# Patient Record
Sex: FEMALE | Race: WHITE | URBAN - METROPOLITAN AREA
[De-identification: names, ages, dates, MRNs, and addresses within clinical notes are randomized per-mention and may not be internally consistent; named-entity substitution may affect disease eponyms.]

---

## 2020-11-12 ENCOUNTER — OFFICE VISIT (OUTPATIENT)
Dept: URBAN - METROPOLITAN AREA CLINIC 92 | Facility: CLINIC | Age: 55
End: 2020-11-12
Payer: COMMERCIAL

## 2020-11-12 VITALS
HEART RATE: 71 BPM | HEIGHT: 65 IN | DIASTOLIC BLOOD PRESSURE: 94 MMHG | WEIGHT: 293 LBS | BODY MASS INDEX: 48.82 KG/M2 | TEMPERATURE: 94.6 F | SYSTOLIC BLOOD PRESSURE: 170 MMHG

## 2020-11-12 DIAGNOSIS — R10.9 ABDOMINAL PAIN: ICD-10-CM

## 2020-11-12 DIAGNOSIS — R19.7 DIARRHEA: ICD-10-CM

## 2020-11-12 DIAGNOSIS — R11.0 NAUSEA: ICD-10-CM

## 2020-11-12 PROCEDURE — 99204 OFFICE O/P NEW MOD 45 MIN: CPT | Performed by: INTERNAL MEDICINE

## 2020-11-12 RX ORDER — ONDANSETRON HYDROCHLORIDE 8 MG/1
1 TABLET AS NEEDED TABLET, FILM COATED ORAL
Qty: 90 | Refills: 1 | OUTPATIENT
Start: 2020-11-12

## 2020-11-12 RX ORDER — HYOSCYAMINE SULFATE 0.12 MG/1
1 TABLET UNDER THE TONGUE AND ALLOW TO DISSOLVE  AS NEEDED TABLET SUBLINGUAL
Qty: 90 | Refills: 3 | OUTPATIENT

## 2020-11-12 NOTE — HPI-OTHER HISTORIES
Chronic pain x yrs, now worse x 1.5mo  s/p CCY many yrs ago   Abd pain Location RUQ Quality sharp, tingling Frequency daily Duration 1-2 days Radiation none Severity = mod to severe Exacerbating factors eating Relieving factors fasting   Assoc with diarrhea, nausea   Denies emesis constipation hematochezia melena jaundice unintentional wt loss   Denies dyspepsia htbrn dysphagia odynophagia food impaction CP cough anorexia light headedness  NSAID usage: occas ibuprofen for achiness  EtOH / Tob: none  Fam Hx GI cancer: none  CT scan, RUQ US both negative  tried Nxium , no relief  no prior colon  lives in [place] worked as

## 2020-12-10 ENCOUNTER — ERX REFILL RESPONSE (OUTPATIENT)
Dept: URBAN - METROPOLITAN AREA CLINIC 92 | Facility: CLINIC | Age: 55
End: 2020-12-10

## 2020-12-10 RX ORDER — ONDANSETRON HYDROCHLORIDE 8 MG/1
1 TABLET AS NEEDED TABLET, FILM COATED ORAL
Qty: 90 | Refills: 1 | OUTPATIENT

## 2020-12-10 RX ORDER — ONDANSETRON HYDROCHLORIDE 8 MG/1
TAKE 1 TABLET AS NEEDED THREE TIMES DAILY TABLET, FILM COATED ORAL
Qty: 90 TABLET | Refills: 1 | OUTPATIENT

## 2021-01-18 ENCOUNTER — OFFICE VISIT (OUTPATIENT)
Dept: URBAN - METROPOLITAN AREA SURGERY CENTER 16 | Facility: SURGERY CENTER | Age: 56
End: 2021-01-18

## 2021-02-19 ENCOUNTER — TELEPHONE ENCOUNTER (OUTPATIENT)
Dept: URBAN - METROPOLITAN AREA CLINIC 92 | Facility: CLINIC | Age: 56
End: 2021-02-19

## 2021-02-22 ENCOUNTER — OFFICE VISIT (OUTPATIENT)
Dept: URBAN - METROPOLITAN AREA SURGERY CENTER 16 | Facility: SURGERY CENTER | Age: 56
End: 2021-02-22

## 2021-03-10 ENCOUNTER — OFFICE VISIT (OUTPATIENT)
Dept: URBAN - METROPOLITAN AREA SURGERY CENTER 16 | Facility: SURGERY CENTER | Age: 56
End: 2021-03-10

## 2021-04-05 ENCOUNTER — OFFICE VISIT (OUTPATIENT)
Dept: URBAN - METROPOLITAN AREA SURGERY CENTER 16 | Facility: SURGERY CENTER | Age: 56
End: 2021-04-05

## 2021-04-05 RX ORDER — ONDANSETRON HYDROCHLORIDE 8 MG/1
TAKE 1 TABLET AS NEEDED THREE TIMES DAILY TABLET, FILM COATED ORAL
Qty: 90 TABLET | Refills: 1 | Status: ACTIVE | COMMUNITY

## 2021-04-05 RX ORDER — HYOSCYAMINE SULFATE 0.12 MG/1
1 TABLET UNDER THE TONGUE AND ALLOW TO DISSOLVE  AS NEEDED TABLET SUBLINGUAL
Qty: 90 | Refills: 3 | Status: ACTIVE | COMMUNITY

## 2021-04-14 ENCOUNTER — OFFICE VISIT (OUTPATIENT)
Dept: URBAN - METROPOLITAN AREA SURGERY CENTER 16 | Facility: SURGERY CENTER | Age: 56
End: 2021-04-14
Payer: COMMERCIAL

## 2021-04-14 DIAGNOSIS — K29.60 ADENOPAPILLOMATOSIS GASTRICA: ICD-10-CM

## 2021-04-14 DIAGNOSIS — R19.4 ALTERED BOWEL HABITS: ICD-10-CM

## 2021-04-14 PROCEDURE — G8907 PT DOC NO EVENTS ON DISCHARG: HCPCS | Performed by: INTERNAL MEDICINE

## 2021-04-14 PROCEDURE — 43239 EGD BIOPSY SINGLE/MULTIPLE: CPT | Performed by: INTERNAL MEDICINE

## 2021-04-14 PROCEDURE — 45380 COLONOSCOPY AND BIOPSY: CPT | Performed by: INTERNAL MEDICINE

## 2021-04-14 RX ORDER — ONDANSETRON HYDROCHLORIDE 8 MG/1
TAKE 1 TABLET AS NEEDED THREE TIMES DAILY TABLET, FILM COATED ORAL
Qty: 90 TABLET | Refills: 1 | Status: ACTIVE | COMMUNITY

## 2021-04-14 RX ORDER — HYOSCYAMINE SULFATE 0.12 MG/1
1 TABLET UNDER THE TONGUE AND ALLOW TO DISSOLVE  AS NEEDED TABLET SUBLINGUAL
Qty: 90 | Refills: 3 | Status: ACTIVE | COMMUNITY

## 2021-04-29 ENCOUNTER — DASHBOARD ENCOUNTERS (OUTPATIENT)
Age: 56
End: 2021-04-29

## 2021-04-29 PROBLEM — 235595009 GASTROESOPHAGEAL REFLUX DISEASE: Status: ACTIVE | Noted: 2021-04-29

## 2021-05-04 ENCOUNTER — OFFICE VISIT (OUTPATIENT)
Dept: URBAN - METROPOLITAN AREA TELEHEALTH 2 | Facility: TELEHEALTH | Age: 56
End: 2021-05-04
Payer: COMMERCIAL

## 2021-05-04 DIAGNOSIS — R10.84 ABDOMINAL CRAMPING, GENERALIZED: ICD-10-CM

## 2021-05-04 DIAGNOSIS — K21.9 GERD: ICD-10-CM

## 2021-05-04 DIAGNOSIS — R11.0 NAUSEA: ICD-10-CM

## 2021-05-04 DIAGNOSIS — R19.7 DIARRHEA: ICD-10-CM

## 2021-05-04 PROCEDURE — 99214 OFFICE O/P EST MOD 30 MIN: CPT | Performed by: INTERNAL MEDICINE

## 2021-05-04 RX ORDER — DIPHENOXYLATE HYDROCHLORIDE AND ATROPINE SULFATE 2.5; .025 MG/1; MG/1
1 TABLET AS NEEDED TABLET ORAL
Qty: 120 TABLET | Refills: 3 | OUTPATIENT
Start: 2021-05-04 | End: 2021-09-01

## 2021-05-04 RX ORDER — HYOSCYAMINE SULFATE 0.12 MG/1
1 TABLET UNDER THE TONGUE AND ALLOW TO DISSOLVE  AS NEEDED TABLET SUBLINGUAL
Qty: 90 | Refills: 3 | COMMUNITY

## 2021-05-04 RX ORDER — ONDANSETRON HYDROCHLORIDE 8 MG/1
TAKE 1 TABLET AS NEEDED THREE TIMES DAILY TABLET, FILM COATED ORAL
Qty: 90 TABLET | Refills: 1 | COMMUNITY

## 2021-05-04 NOTE — HPI-OTHER HISTORIES
wt decr 51# over 6mo (was 326)  4/14/21 EGD reflux esophagitis, non-hpylori gastritis Colonosocpy WNL, bx's negative  s/p CCY many yrs ago   Abd pain Location RUQ Quality sharp, tingling Frequency daily Duration 1-2 days Radiation none Severity = mod to severe Exacerbating factors eating Relieving factors fasting   Assoc with diarrhea 1-4BM/day, nausea  no relief w hyoscyamine   Denies emesis constipation hematochezia melena jaundice unintentional wt loss   Denies dyspepsia htbrn dysphagia odynophagia food impaction CP cough anorexia light headedness  NSAID usage: occas ibuprofen for achiness  EtOH / Tob: none  Fam Hx GI cancer: none  CT scan, RUQ US both negative  tried Nexium , no relief  lives in [place] worked as

## 2021-11-19 ENCOUNTER — ERX REFILL RESPONSE (OUTPATIENT)
Dept: URBAN - METROPOLITAN AREA CLINIC 92 | Facility: CLINIC | Age: 56
End: 2021-11-19

## 2021-11-19 RX ORDER — HYOSCYAMINE SULFATE 0.12 MG/1
PLACE 1 TABLET UNDER THE TONGUE AND ALLOW TO DISSOLVE THREE TIMES A DAY AS NEEDED FOR DIARRHEA TABLET ORAL; SUBLINGUAL
Qty: 90 TABLET | Refills: 3 | OUTPATIENT